# Patient Record
Sex: FEMALE | URBAN - NONMETROPOLITAN AREA
[De-identification: names, ages, dates, MRNs, and addresses within clinical notes are randomized per-mention and may not be internally consistent; named-entity substitution may affect disease eponyms.]

---

## 2020-05-11 ENCOUNTER — NURSE ONLY (OUTPATIENT)
Dept: LAB | Age: 65
End: 2020-05-11

## 2025-01-09 ENCOUNTER — TELEPHONE (OUTPATIENT)
Dept: CARDIOLOGY CLINIC | Age: 70
End: 2025-01-09

## 2025-01-09 NOTE — TELEPHONE ENCOUNTER
Pt called an LM on nurse line to cancel appt on 1/10 due to all her family having Influenza A.   I tried to call her back 3 times and line just rings busy.   Appt cancelled, will need to get ahold of her to reschedule.

## 2025-01-10 NOTE — TELEPHONE ENCOUNTER
Spoke to patient, appointment rescheduled.  Pt only wanted [place] location and was okay with booking out to March.

## 2025-03-13 NOTE — PROGRESS NOTES
Xarelto 20 mg po daily due to h/o PAF, PE  Denies claudication  ROSAMARIA 0.92, 1.03 in 2023  No recent study  Will recheck ROSAMARIA, consider further work up for peripheral arterial disease only if ROSAMARIA is significantly abnormal  CT scan 7/2024 revealed no evidence of stenosis or obstruction of the iliac veins or IVC  She does have h/o CKD   She was advised to continue compression stockings, wound care, elevated legs when possible and limit Na intake  Continue to followed with Dr Ching  She was seen at the vein care center last year and reports having work up and an \"ultrasound\" over there, will refer back to the vein center     Electronically signed by Missy Crooks MD [facility]  3/13/2025 at 6:56 PM EDT

## 2025-03-14 ENCOUNTER — OFFICE VISIT (OUTPATIENT)
Dept: CARDIOLOGY CLINIC | Age: 70
End: 2025-03-14

## 2025-03-14 VITALS
BODY MASS INDEX: 49.26 KG/M2 | SYSTOLIC BLOOD PRESSURE: 131 MMHG | HEART RATE: 49 BPM | WEIGHT: 278 LBS | HEIGHT: 63 IN | DIASTOLIC BLOOD PRESSURE: 60 MMHG

## 2025-03-14 DIAGNOSIS — I87.2 VENOUS INSUFFICIENCY: Primary | ICD-10-CM

## 2025-03-14 RX ORDER — AMLODIPINE BESYLATE 5 MG/1
5 TABLET ORAL DAILY
COMMUNITY

## 2025-03-14 RX ORDER — CEFADROXIL 500 MG/1
CAPSULE ORAL
COMMUNITY
Start: 2025-01-29 | End: 2025-03-14 | Stop reason: ALTCHOICE

## 2025-03-14 RX ORDER — ATORVASTATIN CALCIUM 20 MG/1
TABLET, FILM COATED ORAL
COMMUNITY

## 2025-03-14 RX ORDER — LATANOPROST 50 UG/ML
1 SOLUTION/ DROPS OPHTHALMIC NIGHTLY
COMMUNITY

## 2025-03-14 RX ORDER — DILTIAZEM HYDROCHLORIDE 120 MG/1
CAPSULE, COATED, EXTENDED RELEASE ORAL
COMMUNITY

## 2025-03-14 RX ORDER — HYDROCHLOROTHIAZIDE 25 MG/1
25 TABLET ORAL DAILY
COMMUNITY
Start: 2025-01-13

## 2025-03-14 RX ORDER — FLUTICASONE PROPIONATE 50 MCG
2 SPRAY, SUSPENSION (ML) NASAL DAILY PRN
COMMUNITY

## 2025-03-14 RX ORDER — ATENOLOL 25 MG/1
TABLET ORAL
COMMUNITY

## 2025-03-14 RX ORDER — FLECAINIDE ACETATE 100 MG/1
100 TABLET ORAL 2 TIMES DAILY
COMMUNITY
Start: 2025-01-27

## 2025-03-14 RX ORDER — ENALAPRIL MALEATE 10 MG/1
10 TABLET ORAL DAILY
COMMUNITY
Start: 2025-01-31

## 2025-03-17 ENCOUNTER — TELEPHONE (OUTPATIENT)
Dept: CARDIOLOGY CLINIC | Age: 70
End: 2025-03-17

## 2025-03-17 NOTE — TELEPHONE ENCOUNTER
ROSAMARIA order faxed to [facility]  No answer at their central scheduling, will try again 03-18-25

## 2025-03-17 NOTE — TELEPHONE ENCOUNTER
[facility] called stating ROSAMARIA order is firing an ROSAMARIA.   LM for patient to call back.   Is she having any leg pain??  If so we need to re-enter the ROSAMARIA with claudication as a diagnosis and fax to [phone].  LM with scheduling-when is patient scheduled.

## 2025-03-18 NOTE — TELEPHONE ENCOUNTER
Angelica from [facility] Central Scheduling calling.  ROSAMARIA still throwing ABN.  Added PAD to ROSAMARIA to see if that will work.

## 2025-03-25 ENCOUNTER — RESULTS FOLLOW-UP (OUTPATIENT)
Dept: CARDIOLOGY | Age: 70
End: 2025-03-25

## 2025-03-25 DIAGNOSIS — T14.8XXA NONHEALING NONSURGICAL WOUND: ICD-10-CM

## 2025-03-25 DIAGNOSIS — I73.9 PERIPHERAL ARTERIAL DISEASE: ICD-10-CM

## 2025-03-25 DIAGNOSIS — I87.2 VENOUS INSUFFICIENCY: ICD-10-CM

## 2025-03-25 DIAGNOSIS — I89.0 LYMPHEDEMA: ICD-10-CM

## 2025-03-25 DIAGNOSIS — M79.89 LEG SWELLING: ICD-10-CM

## 2025-03-25 NOTE — TELEPHONE ENCOUNTER
----- Message from Dr. Missy Crooks MD sent at 3/25/2025  1:40 PM EDT -----  Only mild PAD  No intervention is indicated  Has h/o lymphedema, venous stasis  Advise patient to continue to follow with Dr Ching and at the vein center